# Patient Record
Sex: MALE | Race: WHITE | URBAN - NONMETROPOLITAN AREA
[De-identification: names, ages, dates, MRNs, and addresses within clinical notes are randomized per-mention and may not be internally consistent; named-entity substitution may affect disease eponyms.]

---

## 2020-09-15 ENCOUNTER — OFFICE VISIT (OUTPATIENT)
Dept: OPTOMETRY | Age: 70
End: 2020-09-15
Payer: MEDICARE

## 2020-09-15 PROCEDURE — 99203 OFFICE O/P NEW LOW 30 MIN: CPT | Performed by: OPTOMETRIST

## 2020-09-15 PROCEDURE — G8427 DOCREV CUR MEDS BY ELIG CLIN: HCPCS | Performed by: OPTOMETRIST

## 2020-09-15 PROCEDURE — G8421 BMI NOT CALCULATED: HCPCS | Performed by: OPTOMETRIST

## 2020-09-15 PROCEDURE — 99211 OFF/OP EST MAY X REQ PHY/QHP: CPT

## 2020-09-15 PROCEDURE — 3017F COLORECTAL CA SCREEN DOC REV: CPT | Performed by: OPTOMETRIST

## 2020-09-15 PROCEDURE — 4004F PT TOBACCO SCREEN RCVD TLK: CPT | Performed by: OPTOMETRIST

## 2020-09-15 PROCEDURE — 1123F ACP DISCUSS/DSCN MKR DOCD: CPT | Performed by: OPTOMETRIST

## 2020-09-15 PROCEDURE — 4040F PNEUMOC VAC/ADMIN/RCVD: CPT | Performed by: OPTOMETRIST

## 2020-09-15 ASSESSMENT — VISUAL ACUITY
OD_PH_SC: 20/40 OU
OD_SC+: -1
OS_SC: 20/50
OS_SC+: -1
OD_SC: 20/60
METHOD: SNELLEN - LINEAR
OD_PH_SC+: -1
OD_SC: 20/60 OU

## 2020-09-15 ASSESSMENT — ENCOUNTER SYMPTOMS
ALLERGIC/IMMUNOLOGIC NEGATIVE: 0
RESPIRATORY NEGATIVE: 0
EYES NEGATIVE: 0
GASTROINTESTINAL NEGATIVE: 0

## 2020-09-15 ASSESSMENT — REFRACTION_MANIFEST
OD_AXIS: 095
OS_AXIS: 077
OD_SPHERE: +1.75
OD_ADD: +2.25
OS_SPHERE: +0.75
OS_ADD: +2.25
OD_CYLINDER: -2.00
OS_CYLINDER: -1.25

## 2020-09-15 ASSESSMENT — TONOMETRY
IOP_METHOD: NON-CONTACT AIR PUFF
OS_IOP_MMHG: 19
OD_IOP_MMHG: 15

## 2020-09-15 ASSESSMENT — SLIT LAMP EXAM - LIDS
COMMENTS: NORMAL
COMMENTS: NORMAL

## 2020-09-15 NOTE — PROGRESS NOTES
Aydin Arrington presents today for   Chief Complaint   Patient presents with    Blurred Vision    Post-Op Check   . HPI     Blurred Vision     In both eyes. Comments     Last Vision Exam: Not known  Last Ophthalmology Exam: Not know  Last Filled Glass. es Rx: na  Insurance: Medicare  Update: glasses/  Check up ; Wife made the appointment  Feels the vision is ok; does not wear glasses at all                No current outpatient medications on file. No current facility-administered medications for this visit. No family history on file. Social History     Socioeconomic History    Marital status:      Spouse name: Not on file    Number of children: Not on file    Years of education: Not on file    Highest education level: Not on file   Occupational History    Not on file   Social Needs    Financial resource strain: Not on file    Food insecurity     Worry: Not on file     Inability: Not on file    Transportation needs     Medical: Not on file     Non-medical: Not on file   Tobacco Use    Smoking status: Not on file   Substance and Sexual Activity    Alcohol use: Not on file    Drug use: Not on file    Sexual activity: Not on file   Lifestyle    Physical activity     Days per week: Not on file     Minutes per session: Not on file    Stress: Not on file   Relationships    Social connections     Talks on phone: Not on file     Gets together: Not on file     Attends Shinto service: Not on file     Active member of club or organization: Not on file     Attends meetings of clubs or organizations: Not on file     Relationship status: Not on file    Intimate partner violence     Fear of current or ex partner: Not on file     Emotionally abused: Not on file     Physically abused: Not on file     Forced sexual activity: Not on file   Other Topics Concern    Not on file   Social History Narrative    Not on file     No past medical history on file.     ROS     Negative for: Constitutional, Gastrointestinal, Neurological, Skin, Genitourinary, Musculoskeletal, HENT, Endocrine, Cardiovascular, Eyes, Respiratory, Psychiatric, Allergic/Imm, Heme/Lymph          Main Ophthalmology Exam     External Exam       Right Left    External Normal Normal          Slit Lamp Exam       Right Left    Lids/Lashes Normal Normal    Conjunctiva/Sclera White and quiet White and quiet    Cornea Clear Clear    Anterior Chamber Deep and quiet Deep and quiet    Iris Round and reactive Round and reactive    Lens Trace Nuclear sclerosis Trace Nuclear sclerosis    Vitreous Normal Normal          Fundus Exam       Right Left    Disc Normal Normal    C/D Ratio 0.2 0.2    Macula Normal Normal    Vessels Normal Normal                   Tonometry     Tonometry (Non-contact air puff, 5:20 PM)       Right Left    Pressure 15 19   IOPg:  15.3             18.3  CH:  10.9          10.1  WS: 7.6          8.3                  Not recorded         Not recorded          Visual Acuity (Snellen - Linear)       Right Left    Dist sc 20/60 -1 20/50 -1    Dist ph sc 20/40 OU -1     Near sc 20/60 OU           Pupils     Pupils       Pupils    Right PERRL    Left PERRL              Neuro/Psych     Neuro/Psych     Oriented x3:  Yes    Mood/Affect:  Normal               Not recorded            Ophthalmology Exam     Wearing Rx       Sphere    Right none     Left               Manifest Refraction     Manifest Refraction       Sphere Cylinder Axis Add    Right +1.75 -2.00 095 +2.25    Left +0.75 -1.25 077 +2.25          Manifest Refraction #2 (Auto)       Sphere Cylinder Axis Add    Right +2.50 -2.25 102     Left +1.25 -1.50 076                Final Rx       Sphere Cylinder Axis Add    Right +1.75 -2.00 095 +2.25    Left +0.75 -1.25 077 +2.25    Type:  Bifocal    Expiration Date:  9/16/2022   Patient prefers single vision distance only            1. Blurred vision, bilateral    2.  Hyperopia of both eyes with astigmatism and presbyopia Patient Instructions   New glasses recommended;   We can do distance only if desired      Return in about 2 years (around 9/15/2022) for complete eye exam.